# Patient Record
Sex: MALE | Race: WHITE | Employment: OTHER | URBAN - METROPOLITAN AREA
[De-identification: names, ages, dates, MRNs, and addresses within clinical notes are randomized per-mention and may not be internally consistent; named-entity substitution may affect disease eponyms.]

---

## 2019-07-11 ENCOUNTER — HOSPITAL ENCOUNTER (OUTPATIENT)
Dept: NUCLEAR MEDICINE | Age: 79
Discharge: HOME | End: 2019-07-11
Attending: UROLOGY
Payer: MEDICARE

## 2019-07-11 DIAGNOSIS — C61 PROSTATE CANCER (HCC): ICD-10-CM

## 2019-07-11 PROCEDURE — 78306 BONE IMAGING WHOLE BODY: CPT

## 2019-07-19 ENCOUNTER — HOSPITAL ENCOUNTER (OUTPATIENT)
Dept: MRI IMAGING | Age: 79
Discharge: HOME | End: 2019-07-19
Attending: UROLOGY
Payer: MEDICARE

## 2019-07-19 DIAGNOSIS — C61 PROSTATE CANCER (HCC): ICD-10-CM

## 2019-07-19 PROCEDURE — 72197 MRI PELVIS W/O & W/DYE: CPT

## 2019-07-19 PROCEDURE — 74011000258 HC RX REV CODE- 258: Performed by: UROLOGY

## 2019-07-19 PROCEDURE — A9575 INJ GADOTERATE MEGLUMI 0.1ML: HCPCS | Performed by: UROLOGY

## 2019-07-19 PROCEDURE — 74011250636 HC RX REV CODE- 250/636: Performed by: UROLOGY

## 2019-07-19 RX ORDER — GADOTERATE MEGLUMINE 376.9 MG/ML
15 INJECTION INTRAVENOUS
Status: COMPLETED | OUTPATIENT
Start: 2019-07-19 | End: 2019-07-19

## 2019-07-19 RX ORDER — SODIUM CHLORIDE 0.9 % (FLUSH) 0.9 %
10 SYRINGE (ML) INJECTION
Status: COMPLETED | OUTPATIENT
Start: 2019-07-19 | End: 2019-07-19

## 2019-07-19 RX ADMIN — SODIUM CHLORIDE 100 ML: 900 INJECTION, SOLUTION INTRAVENOUS at 11:06

## 2019-07-19 RX ADMIN — Medication 10 ML: at 11:06

## 2019-07-19 RX ADMIN — GADOTERATE MEGLUMINE 15 ML: 376.9 INJECTION INTRAVENOUS at 11:06

## 2019-08-05 ENCOUNTER — ANESTHESIA EVENT (OUTPATIENT)
Dept: SURGERY | Age: 79
End: 2019-08-05
Payer: MEDICARE

## 2019-08-05 RX ORDER — GENTAMICIN SULFATE 40 MG/ML
80 INJECTION, SOLUTION INTRAMUSCULAR; INTRAVENOUS
Status: DISCONTINUED | OUTPATIENT
Start: 2019-08-05 | End: 2019-08-05 | Stop reason: SDUPTHER

## 2019-08-06 ENCOUNTER — ANESTHESIA (OUTPATIENT)
Dept: SURGERY | Age: 79
End: 2019-08-06
Payer: MEDICARE

## 2019-08-06 ENCOUNTER — HOSPITAL ENCOUNTER (OUTPATIENT)
Age: 79
Setting detail: OUTPATIENT SURGERY
Discharge: HOME | End: 2019-08-06
Attending: UROLOGY | Admitting: UROLOGY
Payer: MEDICARE

## 2019-08-06 VITALS
TEMPERATURE: 98 F | HEIGHT: 70 IN | DIASTOLIC BLOOD PRESSURE: 76 MMHG | SYSTOLIC BLOOD PRESSURE: 120 MMHG | OXYGEN SATURATION: 97 % | RESPIRATION RATE: 18 BRPM | BODY MASS INDEX: 23.91 KG/M2 | WEIGHT: 167 LBS | HEART RATE: 58 BPM

## 2019-08-06 LAB
APPEARANCE UR: CLEAR
BILIRUB UR QL: NEGATIVE
COLOR UR: YELLOW
GLUCOSE BLD STRIP.AUTO-MCNC: 142 MG/DL (ref 65–100)
GLUCOSE UR STRIP.AUTO-MCNC: 100 MG/DL
HGB UR QL STRIP: NEGATIVE
KETONES UR QL STRIP.AUTO: NEGATIVE MG/DL
LEUKOCYTE ESTERASE UR QL STRIP.AUTO: NEGATIVE
NITRITE UR QL STRIP.AUTO: NEGATIVE
PH UR STRIP: 5 [PH] (ref 5–9)
PROT UR STRIP-MCNC: NEGATIVE MG/DL
SP GR UR REFRACTOMETRY: 1.02 (ref 1–1.02)
UROBILINOGEN UR QL STRIP.AUTO: 0.2 EU/DL (ref 0.2–1)

## 2019-08-06 PROCEDURE — 76210000063 HC OR PH I REC FIRST 0.5 HR: Performed by: UROLOGY

## 2019-08-06 PROCEDURE — 74011000250 HC RX REV CODE- 250

## 2019-08-06 PROCEDURE — 82962 GLUCOSE BLOOD TEST: CPT

## 2019-08-06 PROCEDURE — 74011250637 HC RX REV CODE- 250/637: Performed by: ANESTHESIOLOGY

## 2019-08-06 PROCEDURE — 81003 URINALYSIS AUTO W/O SCOPE: CPT

## 2019-08-06 PROCEDURE — G0416 PROSTATE BIOPSY, ANY MTHD: HCPCS

## 2019-08-06 PROCEDURE — 74011000258 HC RX REV CODE- 258: Performed by: UROLOGY

## 2019-08-06 PROCEDURE — 76060000032 HC ANESTHESIA 0.5 TO 1 HR: Performed by: UROLOGY

## 2019-08-06 PROCEDURE — 74011250636 HC RX REV CODE- 250/636: Performed by: ANESTHESIOLOGY

## 2019-08-06 PROCEDURE — 74011250636 HC RX REV CODE- 250/636: Performed by: UROLOGY

## 2019-08-06 PROCEDURE — 88305 TISSUE EXAM BY PATHOLOGIST: CPT

## 2019-08-06 PROCEDURE — 76210000020 HC REC RM PH II FIRST 0.5 HR: Performed by: UROLOGY

## 2019-08-06 PROCEDURE — 76010000138 HC OR TIME 0.5 TO 1 HR: Performed by: UROLOGY

## 2019-08-06 PROCEDURE — 74011250636 HC RX REV CODE- 250/636

## 2019-08-06 PROCEDURE — 77030003481 HC NDL BIOP GUN BARD -B: Performed by: UROLOGY

## 2019-08-06 RX ORDER — FENTANYL CITRATE 50 UG/ML
100 INJECTION, SOLUTION INTRAMUSCULAR; INTRAVENOUS ONCE
Status: DISCONTINUED | OUTPATIENT
Start: 2019-08-06 | End: 2019-08-06 | Stop reason: HOSPADM

## 2019-08-06 RX ORDER — SODIUM CHLORIDE, SODIUM LACTATE, POTASSIUM CHLORIDE, CALCIUM CHLORIDE 600; 310; 30; 20 MG/100ML; MG/100ML; MG/100ML; MG/100ML
100 INJECTION, SOLUTION INTRAVENOUS CONTINUOUS
Status: DISCONTINUED | OUTPATIENT
Start: 2019-08-06 | End: 2019-08-06 | Stop reason: HOSPADM

## 2019-08-06 RX ORDER — SODIUM CHLORIDE 0.9 % (FLUSH) 0.9 %
5-40 SYRINGE (ML) INJECTION EVERY 8 HOURS
Status: DISCONTINUED | OUTPATIENT
Start: 2019-08-06 | End: 2019-08-06 | Stop reason: HOSPADM

## 2019-08-06 RX ORDER — LIDOCAINE HYDROCHLORIDE 10 MG/ML
0.1 INJECTION INFILTRATION; PERINEURAL AS NEEDED
Status: DISCONTINUED | OUTPATIENT
Start: 2019-08-06 | End: 2019-08-06 | Stop reason: HOSPADM

## 2019-08-06 RX ORDER — SODIUM CHLORIDE 0.9 % (FLUSH) 0.9 %
5-40 SYRINGE (ML) INJECTION AS NEEDED
Status: DISCONTINUED | OUTPATIENT
Start: 2019-08-06 | End: 2019-08-06 | Stop reason: HOSPADM

## 2019-08-06 RX ORDER — HYDROMORPHONE HYDROCHLORIDE 2 MG/ML
0.5 INJECTION, SOLUTION INTRAMUSCULAR; INTRAVENOUS; SUBCUTANEOUS
Status: DISCONTINUED | OUTPATIENT
Start: 2019-08-06 | End: 2019-08-06 | Stop reason: HOSPADM

## 2019-08-06 RX ORDER — EPHEDRINE SULFATE 50 MG/ML
INJECTION, SOLUTION INTRAVENOUS AS NEEDED
Status: DISCONTINUED | OUTPATIENT
Start: 2019-08-06 | End: 2019-08-06 | Stop reason: HOSPADM

## 2019-08-06 RX ORDER — SODIUM CHLORIDE 9 MG/ML
25 INJECTION, SOLUTION INTRAVENOUS CONTINUOUS
Status: DISCONTINUED | OUTPATIENT
Start: 2019-08-06 | End: 2019-08-06 | Stop reason: HOSPADM

## 2019-08-06 RX ORDER — HYDROCODONE BITARTRATE AND ACETAMINOPHEN 7.5; 325 MG/1; MG/1
1 TABLET ORAL AS NEEDED
Status: DISCONTINUED | OUTPATIENT
Start: 2019-08-06 | End: 2019-08-06 | Stop reason: HOSPADM

## 2019-08-06 RX ORDER — MIDAZOLAM HYDROCHLORIDE 1 MG/ML
2 INJECTION, SOLUTION INTRAMUSCULAR; INTRAVENOUS
Status: DISCONTINUED | OUTPATIENT
Start: 2019-08-06 | End: 2019-08-06 | Stop reason: HOSPADM

## 2019-08-06 RX ORDER — PROPOFOL 10 MG/ML
INJECTION, EMULSION INTRAVENOUS AS NEEDED
Status: DISCONTINUED | OUTPATIENT
Start: 2019-08-06 | End: 2019-08-06 | Stop reason: HOSPADM

## 2019-08-06 RX ORDER — OXYCODONE HYDROCHLORIDE 5 MG/1
5 TABLET ORAL
Status: DISCONTINUED | OUTPATIENT
Start: 2019-08-06 | End: 2019-08-06 | Stop reason: HOSPADM

## 2019-08-06 RX ORDER — PROPOFOL 10 MG/ML
INJECTION, EMULSION INTRAVENOUS
Status: DISCONTINUED | OUTPATIENT
Start: 2019-08-06 | End: 2019-08-06 | Stop reason: HOSPADM

## 2019-08-06 RX ORDER — FAMOTIDINE 20 MG/1
20 TABLET, FILM COATED ORAL ONCE
Status: COMPLETED | OUTPATIENT
Start: 2019-08-06 | End: 2019-08-06

## 2019-08-06 RX ORDER — NALOXONE HYDROCHLORIDE 0.4 MG/ML
0.1 INJECTION, SOLUTION INTRAMUSCULAR; INTRAVENOUS; SUBCUTANEOUS AS NEEDED
Status: DISCONTINUED | OUTPATIENT
Start: 2019-08-06 | End: 2019-08-06 | Stop reason: HOSPADM

## 2019-08-06 RX ADMIN — SODIUM CHLORIDE, SODIUM LACTATE, POTASSIUM CHLORIDE, AND CALCIUM CHLORIDE 100 ML/HR: 600; 310; 30; 20 INJECTION, SOLUTION INTRAVENOUS at 08:15

## 2019-08-06 RX ADMIN — GENTAMICIN SULFATE 80 MG: 40 INJECTION, SOLUTION INTRAMUSCULAR; INTRAVENOUS at 10:27

## 2019-08-06 RX ADMIN — PROPOFOL 20 MG: 10 INJECTION, EMULSION INTRAVENOUS at 10:33

## 2019-08-06 RX ADMIN — PROPOFOL 200 MCG/KG/MIN: 10 INJECTION, EMULSION INTRAVENOUS at 10:34

## 2019-08-06 RX ADMIN — FAMOTIDINE 20 MG: 20 TABLET ORAL at 08:15

## 2019-08-06 RX ADMIN — EPHEDRINE SULFATE 10 MG: 50 INJECTION, SOLUTION INTRAVENOUS at 10:48

## 2019-08-06 NOTE — OP NOTES
Operative Note        Patient: Adelita Hernandez [id number]    Date of Surgery: 08/06/19    Preoperative Diagnosis: Prostate Cancer    Postoperative Diagnosis:  Same    Surgeon(s) and Role:     * Allyn Roque MD - Primary     Anesthesia:  MAC     Procedure: Procedure(s):  [facility] [place] MRI fused TRUS prostate biopsy     Indications:     Discussed the risk of surgery including infection, hematoma, bleeding, recurrence or persistence of symptoms or stone, and the risks of general anesthetic. The patient understands the risks, any and all questions were answered to the patient's satisfaction and they freely signed the consent for operation. [facility] MRI FUSED TRANSRECTAL ULTRASOUND GUIDED BIOPSY OF THE PROSTATE    All risks, benefits and alternatives were again reviewed and he is willing to proceed at this time. The patient was placed in the left lateral decubitus position. I then inserted the transrectal ultrasound probe into the rectum. The prostate fossa was visualized. The prostate fossa demonstrated a appeared homogenous mass anterior to the bladder measuring 13 cc in volume. Ultrasonographic sweep of the prostate fossa/mass was performed to obtain images to link to MRI via URONAV. There were 1 regions of interest based upon MRI imaging. 5 biopsies of regions of interest were then obtained using the ultrasound images for guidance that had been linked to the previous MRI using [facility]ronav. The ultrasound probe was removed. The patient tolerated the procedure well. PROSTATE Fossa Mass VOLUME:  13 cc    Estimated Blood Loss:  minimal    Specimens: prostate fossa mass biopsies             Drains: none                 Implants: * No implants in log *    Chinedu Hagen M.D.     [facility]  [place]  [address] [place] [address] [address]  Phone: [phone]  Fax: [phone]

## 2019-08-06 NOTE — ANESTHESIA PREPROCEDURE EVALUATION
Relevant Problems   No relevant active problems       Anesthetic History               Review of Systems / Medical History  Patient summary reviewed    Pulmonary          Smoker (Former)         Neuro/Psych              Cardiovascular    Hypertension          CAD, cardiac stents (2013) and hyperlipidemia  Pertinent negatives: No past MI and angina  Exercise tolerance: >4 METS     GI/Hepatic/Renal     GERD           Endo/Other    Diabetes: type 2         Other Findings            Physical Exam    Airway  Mallampati: II    Neck ROM: decreased range of motion   Mouth opening: Normal     Cardiovascular  Regular rate and rhythm,  S1 and S2 normal,  no murmur, click, rub, or gallop             Dental  No notable dental hx       Pulmonary  Breath sounds clear to auscultation               Abdominal         Other Findings            Anesthetic Plan    ASA: 3  Anesthesia type: total IV anesthesia            Anesthetic plan and risks discussed with: Patient

## 2019-08-06 NOTE — DISCHARGE INSTRUCTIONS
Patient Education        Prostate Biopsy: About This Test  What is it? A prostate biopsy is a type of test. Your doctor takes small tissue samples from your prostate gland. Then another doctor looks at the tissue under a microscope to see if there are cancer cells. This test is done by a doctor who specializes in men's genital and urinary problems (urologist). It can be done in your doctor's office, a day surgery clinic, or a hospital operating room. To get the tissue samples from the prostate, the doctor inserts a thin needle through the rectum, the urethra, or the area between the anus and scrotum (perineum). The most common method is through the rectum. Your doctor may use ultrasound to help guide the needle. Why is this test done? You may need a prostate biopsy if your doctor found something of concern during a digital rectal exam. Or you may need it if a blood test showed a high level of prostate-specific antigen (PSA). A biopsy can help find out if you have prostate cancer. How can you prepare for this test?  Before you have a prostate biopsy, tell your doctor if you are taking any medicines or using any herbal supplements, or if you are allergic to any medicines. And tell your doctor if you have had bleeding problems, or you take aspirin or some other blood thinner. What happens before the test?  · You may need to have an enema before the test.  · You will need to take off all or most of your clothes. You will be given a cloth or paper gown to use during the test.  · You may be given a sedative through a vein (IV) in your arm. The sedative will help you relax and stay still. What happens during the test?  Some men have an MRI of the prostate before their biopsy. This helps to find the areas in the prostate to take biopsy samples. If you have an MRI, your doctor will use ultrasound and the MRI results to find the areas to biopsy.   Through the rectum  · You may be asked to kneel, lie on your side, or lie on your back. · Your doctor may inject an anesthetic around the prostate to numb the area before the sample is taken. · Ultrasound is often used to guide the needle to the correct spot. · Your doctor may choose to use a needle guide for the biopsy. He or she will attach the guide to a finger. Your doctor will insert the finger into your rectum. · The needle will enter the prostate and take 6 to 12 samples. Through the urethra  · You will lie on your back. Your feet will rest in stirrups. · You will get anesthesia. The anesthesia may make you sleep. Or it may just numb the area being worked on.  · Your doctor will insert a lighted scope (cystoscope) into your urethra. The scope allows your doctor to look directly at the prostate. Your doctor will pass a cutting loop through the scope to remove samples of prostate tissue. Through the perineum  · You will lie on an exam table either on your side or on your back with your knees bent. You will get anesthesia. The anesthesia may make you sleep. Or it may just numb the area being worked on.  · Your doctor will make a small cut in your perineum. Then he or she will insert a finger into the rectum to hold the prostate. · Your doctor will then insert the needle through the cut and into the prostate. · The needle collects samples of tissue and is then pulled out. What else should you know about this test?  · A prostate biopsy has a slight risk of causing problems such as infection or bleeding. · If the biopsy went through your rectum, you may have a small amount of bleeding from your rectum for 2 to 3 days after the biopsy. · You may have a little pain in your pelvic area. You may also have a little blood in your urine for 1 to 5 days. · You may have some blood in your semen for a week or longer. How long will the test take? This test will take about 15 to 45 minutes.   What happens after the test?  Your doctor will tell you what to expect and watch for after your test. In general:  · If you have anesthesia that makes you sleep, you will be in a recovery room for a few hours. You will need someone to drive you home. You may feel tired for the rest of the day. · You will probably be able to go home right away. · If your doctor had you stop taking any medicine for the biopsy, ask him or her when you can start taking it again. If you were given antibiotics, take them as directed. · Do not do heavy work or exercise for 4 hours after the test.  · Your doctor will tell you how long it may take to get your results back. Follow-up care is a key part of your treatment and safety. Be sure to make and go to all appointments, and call your doctor if you are having problems. It's also a good idea to keep a list of the medicines you take. Ask your doctor when you can expect to have your test results. Where can you learn more? Go to http://kolton-damien.info/. Enter Y504 in the search box to learn more about \"Prostate Biopsy: About This Test.\"  Current as of: December 19, 2018  Content Version: 12.1  © 1489-5484 [employer]. Care instructions adapted under license by [employer] (which disclaims liability or warranty for this information). If you have questions about a medical condition or this instruction, always ask your healthcare professional. [address] any warranty or liability for your use of this information. TYPICAL SIDE EFFECTS OF PAIN MEDICATION:  *    Constipation: Drink lots of fluids. Over the counter stool softener if needed. *    Nausea: Take pain medication with food. Call your doctor with persistent nausea. ACTIVITY  · As tolerated and as directed by your doctor. · Bathe or shower as directed by your doctor. DIET  · Day of surgery: Clear liquids until no nausea or vomiting; small portion, light diet Bay foods (ex: baked chicken, plain rice, grits, scrambled eggs, toast). Nothing greasy, fried or spicy today. · Advance to regular diet on second day, unless your doctor orders otherwise. · If nausea and vomiting continues, call your doctor. PAIN  · Take pain medication as directed by your doctor. · DO NOT take aspirin or blood thinners unless directed by your doctor. CALL YOUR DOCTOR IF    s Call your doctor if pain is NOT relieved by medication.   s Excessive bleeding that does not stop after holding pressure over the area  · Temperature of 101 degrees F or above  · Excessive redness, swelling or bruising, and/ or green or yellow, smelly discharge from incision    AFTER ANESTHESIA   · For the first 24 hours: DO NOT Drive, Drink alcoholic beverages, or Make important decisions. · Be aware of dizziness following anesthesia and while taking pain medication. DISCHARGE SUMMARY from Nurse    PATIENT INSTRUCTIONS:    After general anesthesia or intravenous sedation, for 24 hours or while taking prescription Narcotics:  · Limit your activities  · Do not drive and operate hazardous machinery  · Do not make important personal or business decisions  · Do  not drink alcoholic beverages  · If you have not urinated within 8 hours after discharge, please contact your surgeon on call. *  Please give a list of your current medications to your Primary Care Provider. *  Please update this list whenever your medications are discontinued, doses are      changed, or new medications (including over-the-counter products) are added. *  Please carry medication information at all times in case of emergency situations. Preventing Infection at Home  We care about preventing infection and avoiding the spread of germs - not only when you are in the hospital but also when you return home. When you return home from the hospital, its important to take the following steps to help prevent infection and avoid spreading germs that could infect you and others.  Ask everyone in your home to follow these guidelines, too. Clean Your Hands  · Clean your hands whenever your hands are visibly dirty, before you eat, before or after touching your mouth, nose or eyes, and before preparing food. Clean them after contact with body fluids, using the restroom, touching animals or changing diapers. · When washing hands, wet them with warm water and work up a lather. Rub hands for at least 15 seconds, then rinse them and pat them dry with a clean towel or paper towel. · When using hand sanitizers, it should take about 15 seconds to rub your hands dry. If not, you probably didnt apply enough . Cover Your Sneeze or Cough  Germs are released into the air whenever you sneeze or cough. To prevent the spread of infection:  · Turn away from other people before coughing or sneezing. · Cover your mouth or nose with a tissue when you cough or sneeze. Put the tissue in the trash. · If you dont have a tissue, cough or sneeze into your upper sleeve, not your hands. · Always clean your hands after coughing or sneezing. Care for Wounds  Your skin is your bodys first line of defense against germs, but an open wound leaves an easy way for germs to enter your body. To prevent infection:  · Clean your hands before and after changing wound dressings, and wear gloves to change dressings if recommended by your doctor. · Take special care with IV lines or other devices inserted into the body. If you must touch them, clean your hands first.  · Follow any specific instructions from your doctor to care for your wounds. Contact your doctor if you experience any signs of infection, such as fever or increased redness at the surgical or wound site. Keep a Clean Home  · Clean or wipe commonly touched hard surfaces like door handles, sinks, tabletops, phones and TV remotes. · Use products labeled disinfectant to kill harmful bacteria and viruses. · Use a clean cloth or paper towel to clean and dry surfaces. Wiping surfaces with a dirty dishcloth, sponge or towel will only spread germs. · Never share toothbrushes, hicks, drinking glasses, utensils, razor blades, face cloths or bath towels to avoid spreading germs. · Be sure that the linens that you sleep on are clean. · Keep pets away from wounds and wash your hands after touching pets, their toys or bedding. We care about you and your health. Remember, preventing infections is a team effort between you, your family, friends and health care providers. These are general instructions for a healthy lifestyle:    No smoking/ No tobacco products/ Avoid exposure to second hand smoke    Surgeon General's Warning:  Quitting smoking now greatly reduces serious risk to your health. Obesity, smoking, and sedentary lifestyle greatly increases your risk for illness    A healthy diet, regular physical exercise & weight monitoring are important for maintaining a healthy lifestyle    You may be retaining fluid if you have a history of heart failure or if you experience any of the following symptoms:  Weight gain of 3 pounds or more overnight or 5 pounds in a week, increased swelling in our hands or feet or shortness of breath while lying flat in bed. Please call your doctor as soon as you notice any of these symptoms; do not wait until your next office visit. Recognize signs and symptoms of STROKE:    F-face looks uneven    A-arms unable to move or move unevenly    S-speech slurred or non-existent    T-time-call 911 as soon as signs and symptoms begin-DO NOT go       Back to bed or wait to see if you get better-TIME IS BRAIN.

## 2019-08-06 NOTE — ANESTHESIA POSTPROCEDURE EVALUATION
Procedure(s):  MRI FUSION GUIDED PROSTATE BIOPSY. total IV anesthesia    Anesthesia Post Evaluation        Patient location during evaluation: PACU  Patient participation: complete - patient participated  Level of consciousness: awake and alert  Pain management: adequate  Airway patency: patent  Anesthetic complications: no  Cardiovascular status: acceptable  Respiratory status: acceptable  Hydration status: acceptable  Post anesthesia nausea and vomiting:  none      Vitals Value Taken Time   /70 8/6/2019 11:08 AM   Temp 36.4 °C (97.6 °F) 8/6/2019 11:04 AM   Pulse 67 8/6/2019 11:09 AM   Resp 14 8/6/2019 11:08 AM   SpO2 100 % 8/6/2019 11:09 AM   Vitals shown include unvalidated device data.

## 2019-08-06 NOTE — H&P
[address] Urology H&P Note                                           08/06/19     Patient: Abbey Ross  MRN: [id number]    Admission Date:  8/6/2019, 0  Admission Diagnosis: Prostate cancer [facility]) Kylah Sexton    ASSESSMENT: 70 y.o. male with likely recurrent prostate cancer with area of concern for local recurrence on MRI near the anastomosis of his prior prostatectomy site. PLAN:  -To OR today for MRI fusion guided biopsy   -Consented  -Antibiotics on call to OR  -NPO for surgery. __________________________________________________________________________________    HPI:     Abbey Ross is a 70 y.o. male with a PMH of prostate cancer s/p RRP by Dr. Jose Strong at [facility] in 2007.  Pathology unfortunately is not available for review, but the patient reports being told that he was [place] risk\" and had a \"positive margin. \"       He subsequently developed a biochemical recurrence and followed with Dr. Rocky Zayas at [facility] in 2017. [facility] and bone scan at that time showed an enhancing nodule in the prostate bed and no bone metastases respectively.  The nodule was biopsied X 2 by Dr. Rocky Zayas and negative for cancer. Keren Prasad was started on Lupron in 2017 and got 1 injection (6 months) but could not tolerate the hot flashes and therefore decided to stop the injections.  PSA after the lupron injection was undetectable per [facility] medical record.     He has since followed with his PCP at the [facility] for his prostate cancer and is now referred to see me for a rising PSA, which most recently was 2.09 in 4/2019 and now 2.2 in 7/2019.      MRI/Bone scan recommended at last visit were reviewed today by me and show likely recurrence near the anastomosis but no lymphadenopathy or bone involvement.   MRI fusion biopsy was recommended and he presents today for the procedure.      He denies any urgency, frequency, retention, hematuria, incontinence, urinary retention, UTIs or other urinary symptoms.  No unexplained weight loss, bone pain, fevers, night sweats.       Past Medical History:  Past Medical History:   Diagnosis Date    CAD (coronary artery disease) 2013    stent x1    Diabetes (HCC)     oral medications, avg 130-150, no symptoms hypoglycemia    Elevated PSA     Fatty liver     GERD (gastroesophageal reflux disease)     Heart abnormality     Hypercholesterolemia     Hypertension     Personal history of prostate cancer        Past Surgical History:  Past Surgical History:   Procedure Laterality Date    HX COLONOSCOPY      HX HEART CATHETERIZATION  2013    stent    HX PROSTATE SURGERY  10/2007    radical prostatectomy       Medications:  No current facility-administered medications on file prior to encounter. Current Outpatient Medications on File Prior to Encounter   Medication Sig Dispense Refill    amLODIPine (NORVASC) 5 mg tablet Take 10 mg by mouth.  omeprazole (PRILOSEC) 20 mg capsule Take 20 mg by mouth.  aspirin 81 mg chewable tablet Take 81 mg by mouth.  latanoprost (XALATAN) 0.005 % ophthalmic solution Administer 1 Drop to left eye nightly.  ursodiol (ACTIGALL) 300 mg capsule Take 300 mg by mouth two (2) times a day.  vitamin E (AQUA GEMS) 400 unit capsule Take 400 Units by mouth.  metFORMIN (GLUCOPHAGE) 500 mg tablet Take 500 mg by mouth.  glipiZIDE (GLUCOTROL) 5 mg tablet Take 2.5 mg by mouth.  metoprolol tartrate (LOPRESSOR) 25 mg tablet Take 25 mg by mouth.  benazepril (LOTENSIN) 40 mg tablet Take 40 mg by mouth two (2) times a day.  pravastatin (PRAVACHOL) 10 mg tablet Take 80 mg by mouth.  metOLazone (ZAROXOLYN) 5 mg tablet Take  by mouth as needed.          Allergies:  No Known Allergies    Social History:  Social History     Socioeconomic History    Marital status:      Spouse name: Not on file    Number of children: Not on file    Years of education: Not on file    Highest education level: Not on file   Occupational History    Not on file   Social Needs    Financial resource strain: Not on file    Food insecurity:     Worry: Not on file     Inability: Not on file    Transportation needs:     Medical: Not on file     Non-medical: Not on file   Tobacco Use    Smoking status: Former Smoker     Packs/day: 0.50     Years: 30.00     Pack years: 15.00     Last attempt to quit:      Years since quittin.6    Smokeless tobacco: Former User   Substance and Sexual Activity    Alcohol use: Never     Frequency: Never    Drug use: Not on file    Sexual activity: Not on file   Lifestyle    Physical activity:     Days per week: Not on file     Minutes per session: Not on file    Stress: Not on file   Relationships    Social connections:     Talks on phone: Not on file     Gets together: Not on file     Attends Yazidi service: Not on file     Active member of club or organization: Not on file     Attends meetings of clubs or organizations: Not on file     Relationship status: Not on file    Intimate partner violence:     Fear of current or ex partner: Not on file     Emotionally abused: Not on file     Physically abused: Not on file     Forced sexual activity: Not on file   Other Topics Concern    Not on file   Social History Narrative    Not on file       Family History:  Family History   Problem Relation Age of Onset    Diabetes Father     Heart Disease Father     Hypertension Mother     Diabetes Mother     No Known Problems Sister     Diabetes Brother     Heart Attack Brother     Diabetes Brother     No Known Problems Sister        ROS:  Review of Systems - General ROS: negative for - chills, fatigue or fever  Respiratory ROS: no cough, shortness of breath, or wheezing  Cardiovascular ROS: no chest pain or dyspnea on exertion  Gastrointestinal ROS: no abdominal pain, change in bowel habits, or black or bloody stools  Musculoskeletal ROS: negative  negative for - muscular weakness    Vitals:  Visit Vitals  /81 (BP 1 Location: Left arm, BP Patient Position: At rest;Supine)   Pulse (!) 59   Temp 97.4 °F (36.3 °C)   Resp 16   Ht 5' 10\" (1.778 m)   Wt 167 lb (75.8 kg)   SpO2 97%   BMI 23.96 kg/m²       Intake/Output:  No intake or output data in the 24 hours ending 08/06/19 0853     Physical Exam:   Visit Vitals  /81 (BP 1 Location: Left arm, BP Patient Position: At rest;Supine)   Pulse (!) 59   Temp 97.4 °F (36.3 °C)   Resp 16   Ht 5' 10\" (1.778 m)   Wt 167 lb (75.8 kg)   SpO2 97%   BMI 23.96 kg/m²        GENERAL: No acute distress, Awake, Alert, Oriented X 3  HEENT: Trachea midline, No gross visual or auditory impairments  CARDIAC: regular rate and rhythm  CHEST AND LUNG: Easy work of breathing  ABDOMEN: soft, non tender, non-distended  SKIN: No rash, no erythema, no lacerations or abrasions, no ecchymosis  NEUROLOGIC: cranial nerves 2-12 grossly intact     Lab/Radiology/Other Diagnostic Tests:  No results for input(s): HGB, HCT, WBC, NA, K, CL, CO2, BUN, CR, GLU, CA, MG, ALBUMIN, ALT, PREALB, INR, HGBEXT, HCTEXT in the last 72 hours. No lab exists for component: PLTCT, PO4, TOTPROT, TOTBILI, AST, ALKPHOS, JAMILA, LIPASE, PT, PTT, INREXT      Chinedu Price M.D.     [facility]  [place]  [address] [place] [address] [address]  Phone: [phone]  Fax: [phone]

## 2019-08-09 NOTE — PROGRESS NOTES
Called patient with pathology report. Pathology suspicious for prostate cancer. Offered surveillance vs. Referral to radiation oncology.   He would like referral to radiation oncology

## (undated) DEVICE — MAX-CORE® DISPOSABLE CORE BIOPSY INSTRUMENT, 18G X 25CM: Brand: MAX-CORE

## (undated) DEVICE — KIT BX PROST 20ML VI PREFIL W 10ML 10% NEUT BUFF FRMLN LEAK

## (undated) DEVICE — DRAPE TWL SURG 16X26IN BLU ORB04] ALLCARE INC]